# Patient Record
Sex: FEMALE | Race: BLACK OR AFRICAN AMERICAN | Employment: UNEMPLOYED
[De-identification: names, ages, dates, MRNs, and addresses within clinical notes are randomized per-mention and may not be internally consistent; named-entity substitution may affect disease eponyms.]

---

## 2017-08-12 ENCOUNTER — HEALTH MAINTENANCE LETTER (OUTPATIENT)
Age: 42
End: 2017-08-12

## 2018-08-27 ENCOUNTER — OFFICE VISIT (OUTPATIENT)
Dept: FAMILY MEDICINE | Facility: CLINIC | Age: 43
End: 2018-08-27
Payer: MEDICARE

## 2018-08-27 VITALS
BODY MASS INDEX: 19.52 KG/M2 | OXYGEN SATURATION: 98 % | HEIGHT: 64 IN | HEART RATE: 79 BPM | DIASTOLIC BLOOD PRESSURE: 80 MMHG | SYSTOLIC BLOOD PRESSURE: 132 MMHG | TEMPERATURE: 98.6 F | WEIGHT: 114.3 LBS | RESPIRATION RATE: 16 BRPM

## 2018-08-27 DIAGNOSIS — N76.5 VAGINAL ULCERATION: ICD-10-CM

## 2018-08-27 DIAGNOSIS — B96.89 BV (BACTERIAL VAGINOSIS): Primary | ICD-10-CM

## 2018-08-27 DIAGNOSIS — J45.20 MILD INTERMITTENT ASTHMA WITHOUT COMPLICATION: ICD-10-CM

## 2018-08-27 DIAGNOSIS — L30.9 ECZEMA, UNSPECIFIED TYPE: ICD-10-CM

## 2018-08-27 DIAGNOSIS — Z11.4 SCREENING FOR HIV (HUMAN IMMUNODEFICIENCY VIRUS): ICD-10-CM

## 2018-08-27 DIAGNOSIS — B37.31 CANDIDAL VULVOVAGINITIS: ICD-10-CM

## 2018-08-27 DIAGNOSIS — Z11.3 SCREEN FOR STD (SEXUALLY TRANSMITTED DISEASE): ICD-10-CM

## 2018-08-27 DIAGNOSIS — Z12.4 SCREENING FOR MALIGNANT NEOPLASM OF CERVIX: ICD-10-CM

## 2018-08-27 DIAGNOSIS — N76.0 BV (BACTERIAL VAGINOSIS): Primary | ICD-10-CM

## 2018-08-27 LAB
ALBUMIN UR-MCNC: NEGATIVE MG/DL
APPEARANCE UR: CLEAR
BACTERIA #/AREA URNS HPF: ABNORMAL /HPF
BILIRUB UR QL STRIP: NEGATIVE
COLOR UR AUTO: YELLOW
GLUCOSE UR STRIP-MCNC: NEGATIVE MG/DL
HGB UR QL STRIP: NEGATIVE
KETONES UR STRIP-MCNC: ABNORMAL MG/DL
LEUKOCYTE ESTERASE UR QL STRIP: ABNORMAL
MUCOUS THREADS #/AREA URNS LPF: PRESENT /LPF
NITRATE UR QL: NEGATIVE
NON-SQ EPI CELLS #/AREA URNS LPF: ABNORMAL /LPF
PH UR STRIP: 6.5 PH (ref 5–7)
RBC #/AREA URNS AUTO: ABNORMAL /HPF
SOURCE: ABNORMAL
SP GR UR STRIP: 1.02 (ref 1–1.03)
SPECIMEN SOURCE: ABNORMAL
UROBILINOGEN UR STRIP-ACNC: 0.2 EU/DL (ref 0.2–1)
WBC #/AREA URNS AUTO: ABNORMAL /HPF
WET PREP SPEC: ABNORMAL

## 2018-08-27 PROCEDURE — 87624 HPV HI-RISK TYP POOLED RSLT: CPT | Performed by: PHYSICIAN ASSISTANT

## 2018-08-27 PROCEDURE — 87210 SMEAR WET MOUNT SALINE/INK: CPT | Performed by: PHYSICIAN ASSISTANT

## 2018-08-27 PROCEDURE — 87529 HSV DNA AMP PROBE: CPT | Performed by: PHYSICIAN ASSISTANT

## 2018-08-27 PROCEDURE — 87491 CHLMYD TRACH DNA AMP PROBE: CPT | Performed by: PHYSICIAN ASSISTANT

## 2018-08-27 PROCEDURE — 99214 OFFICE O/P EST MOD 30 MIN: CPT | Performed by: PHYSICIAN ASSISTANT

## 2018-08-27 PROCEDURE — G0145 SCR C/V CYTO,THINLAYER,RESCR: HCPCS | Performed by: PHYSICIAN ASSISTANT

## 2018-08-27 PROCEDURE — 81001 URINALYSIS AUTO W/SCOPE: CPT | Performed by: PHYSICIAN ASSISTANT

## 2018-08-27 PROCEDURE — G0476 HPV COMBO ASSAY CA SCREEN: HCPCS | Performed by: PHYSICIAN ASSISTANT

## 2018-08-27 PROCEDURE — 87591 N.GONORRHOEAE DNA AMP PROB: CPT | Performed by: PHYSICIAN ASSISTANT

## 2018-08-27 RX ORDER — TRIAMCINOLONE ACETONIDE 5 MG/G
CREAM TOPICAL
Qty: 45 G | Refills: 1 | Status: SHIPPED | OUTPATIENT
Start: 2018-08-27 | End: 2019-11-21

## 2018-08-27 RX ORDER — MONTELUKAST SODIUM 10 MG/1
10 TABLET ORAL AT BEDTIME
Qty: 90 TABLET | Refills: 3 | Status: SHIPPED | OUTPATIENT
Start: 2018-08-27 | End: 2019-11-21

## 2018-08-27 RX ORDER — METRONIDAZOLE 500 MG/1
500 TABLET ORAL 2 TIMES DAILY
Qty: 14 TABLET | Refills: 0 | Status: SHIPPED | OUTPATIENT
Start: 2018-08-27 | End: 2019-11-21

## 2018-08-27 RX ORDER — FLUCONAZOLE 150 MG/1
150 TABLET ORAL ONCE
Qty: 1 TABLET | Refills: 0 | Status: SHIPPED | OUTPATIENT
Start: 2018-08-27 | End: 2018-08-27

## 2018-08-27 RX ORDER — ISOTRETINOIN 20 MG/1
20 CAPSULE ORAL 2 TIMES DAILY
Status: CANCELLED | OUTPATIENT
Start: 2018-08-27

## 2018-08-27 RX ORDER — ALBUTEROL SULFATE 90 UG/1
2 AEROSOL, METERED RESPIRATORY (INHALATION) EVERY 4 HOURS PRN
Qty: 1 INHALER | Refills: 3 | Status: SHIPPED | OUTPATIENT
Start: 2018-08-27 | End: 2019-12-02

## 2018-08-27 ASSESSMENT — PAIN SCALES - GENERAL: PAINLEVEL: EXTREME PAIN (9)

## 2018-08-27 NOTE — LETTER
September 4, 2018    Vita Hahn Nguyễn  [address]  [place] [address]    Dear Vita,  We are happy to inform you that your PAP smear result from 8/27/18 is normal.  We are now able to do a follow up test on PAP smears. The DNA test is for HPV (Human Papilloma Virus). Cervical cancer is closely linked with certain types of HPV. Your results showed no evidence of high risk HPV.  Therefore we recommend you return in 5 years for your next pap smear and HPV test.  You will still need to return to the clinic every year for an annual exam and other preventive tests.  Please contact the clinic at [phone] with any questions.  Sincerely,    Krysten Jones PA-C/ramakrishna

## 2018-08-27 NOTE — PROGRESS NOTES
SUBJECTIVE:   Vita Adrian is a 43 year old female who presents to clinic today for the following health issues:    Vaginal Symptoms  Onset: 3 days ago     Description:  Vaginal Discharge: white green curd-like   Itching (Pruritis): YES  Burning sensation:  YES  Odor: YES    Accompanying Signs & Symptoms:  Pain with Urination: YES  Abdominal Pain: YES  Fever: YES  Chills: YES     History:   Sexually active: YES- was last time over a week ago   New Partner: no   Possibility of Pregnancy:  No    Precipitating factors:   Recent Antibiotic Use: no     Therapies Tried and outcome: None    Asthma:  Stable  No recent triggers   Patient has been out of inhaler and Singulair for a few months.     Eczema:  Patient gets eczema on elbows. Her dermatologist prescribes her Triamcinolone.   Patient needs a refill.     Problem list and histories reviewed & adjusted, as indicated.  Additional history: as documented    Patient Active Problem List   Diagnosis     Atopic dermatitis     Seasonal allergic rhinitis     Allergic rhinitis due to animal dander     Rhinitis, allergic to other allergen     Mild intermittent asthma     Seasonal allergic conjunctivitis     CARDIOVASCULAR SCREENING; LDL GOAL LESS THAN 160     Vitamin D deficiency     Diagnostic skin and sensitization tests     Health Care Home     Cervical radiculopathy - right>left     Flexor tendon laceration, hand, open wound - left 5th      Schizophrenia (H)     Major depressive disorder, recurrent episode, severe (H)     Generalized anxiety disorder     Past Surgical History:   Procedure Laterality Date     C NONSPECIFIC PROCEDURE      Elective Abortions x2     REPAIR TENDON FINGER(S)  5/18/2012    Procedure:REPAIR TENDON FINGER(S); Left Small Finger Flexor Tendon Laceration Repair  ; Surgeon:MIGUE KATHLEEN; Location: OR       Social History   Substance Use Topics     Smoking status: Never Smoker     Smokeless tobacco: Never Used     Alcohol use No     Family  History   Problem Relation Age of Onset     Diabetes No family hx of      Cerebrovascular Disease No family hx of      Thyroid Disease No family hx of      Cancer Mother      Hypertension Maternal Grandmother      Glaucoma Maternal Grandmother      Macular Degeneration Maternal Grandmother          Current Outpatient Prescriptions   Medication Sig Dispense Refill     albuterol (PROAIR HFA/PROVENTIL HFA/VENTOLIN HFA) 108 (90 Base) MCG/ACT inhaler Inhale 2 puffs into the lungs every 4 hours as needed for shortness of breath / dyspnea 1 Inhaler 3     isotretinoin (CLARAVIS) 20 MG capsule Take 20 mg by mouth 2 times daily.       metroNIDAZOLE (FLAGYL) 500 MG tablet Take 1 tablet (500 mg) by mouth 2 times daily 14 tablet 0     mometasone (NASONEX) 50 MCG/ACT nasal spray Spray 2 sprays in nostril daily. 1 Box 11     montelukast (SINGULAIR) 10 MG tablet Take 1 tablet (10 mg) by mouth At Bedtime 90 tablet 3     polyvinyl alcohol (ARTIFICIAL TEARS) 1.4 % ophthalmic solution Apply 1 drop to eye 4 times daily as needed. 15 mL 11     triamcinolone (KENALOG) 0.5 % cream Apply  topically 2 times daily as needed. 45 g 1     ethynodiol-ethinyl estradiol (KELNOR) 1-35 MG-MCG per tablet Take 1 tablet by mouth daily. (Patient not taking: Reported on 8/27/2018) 28 tablet 6     levocetirizine (XYZAL) 5 MG tablet Take 1 tablet by mouth every evening. (Patient not taking: Reported on 8/27/2018) 30 tablet 11     omeprazole (PRILOSEC) 40 MG capsule Take 1 capsule by mouth daily. Take 30 minutes before breakfast. (Patient not taking: Reported on 8/27/2018) 30 capsule 11     Allergies   Allergen Reactions     Penicillins        Reviewed and updated as needed this visit by clinical staff  Tobacco  Allergies  Meds  Med Hx  Surg Hx  Fam Hx  Soc Hx      Reviewed and updated as needed this visit by Provider         ROS:  Constitutional, HEENT, cardiovascular, pulmonary, GI, , musculoskeletal, neuro, skin, endocrine and psych systems are  "negative, except as otherwise noted.    OBJECTIVE:     /80 (BP Location: Right arm, Patient Position: Sitting, Cuff Size: Adult Regular)  Pulse 79  Temp 98.6  F (37  C) (Oral)  Resp 16  Ht 5' 4\" (1.626 m)  Wt 114 lb 4.8 oz (51.8 kg)  LMP 07/30/2018 (Approximate)  SpO2 98%  BMI 19.62 kg/m2  Body mass index is 19.62 kg/(m^2).  GENERAL: healthy, alert and no distress  NECK: no adenopathy, no asymmetry, masses, or scars and thyroid normal to palpation  RESP: lungs clear to auscultation - no rales, rhonchi or wheezes  CV: regular rate and rhythm, normal S1 S2, no S3 or S4, no murmur, click or rub, no peripheral edema and peripheral pulses strong  ABDOMEN: soft, nontender, no hepatosplenomegaly, no masses and bowel sounds normal   (female): normal female external genitalia, normal urethral meatus , vaginal mucosa is erythematous, inflamed, with small ulcerations ,  moist, well rugated, vaginal discharge - copious, yellow and green and normal cervix, adnexae, and uterus without masses.  MS: no gross musculoskeletal defects noted, no edema    Diagnostic Test Results:  Results for orders placed or performed in visit on 08/27/18   *UA reflex to Microscopic and Culture ([facility] (except [place] and [place])   Result Value Ref Range    Color Urine Yellow     Appearance Urine Clear     Glucose Urine Negative NEG^Negative mg/dL    Bilirubin Urine Negative NEG^Negative    Ketones Urine Trace (A) NEG^Negative mg/dL    Specific Gravity Urine 1.025 1.003 - 1.035    Blood Urine Negative NEG^Negative    pH Urine 6.5 5.0 - 7.0 pH    Protein Albumin Urine Negative NEG^Negative mg/dL    Urobilinogen Urine 0.2 0.2 - 1.0 EU/dL    Nitrite Urine Negative NEG^Negative    Leukocyte Esterase Urine Trace (A) NEG^Negative    Source Midstream Urine    Urine Microscopic   Result Value Ref Range    WBC Urine 0 - 5 OTO5^0 - 5 /HPF    RBC Urine O - 2 OTO2^O - 2 /HPF    Squamous Epithelial /LPF Urine Few FEW^Few " /LPF    Bacteria Urine Few (A) NEG^Negative /HPF    Mucous Urine Present (A) NEG^Negative /LPF   Wet prep   Result Value Ref Range    Specimen Description Vagina     Wet Prep No Trichomonas seen     Wet Prep Clue cells seen (A)     Wet Prep Yeast seen (A)        ASSESSMENT/PLAN:               ICD-10-CM    1. BV (bacterial vaginosis) N76.0 metroNIDAZOLE (FLAGYL) 500 MG tablet    B96.89    2. Candidal vulvovaginitis B37.3 fluconazole (DIFLUCAN) 150 MG tablet   3. Vaginal ulceration N76.5 HSV 1 and 2 DNA by PCR   4. Mild intermittent asthma without complication J45.20 albuterol (PROAIR HFA/PROVENTIL HFA/VENTOLIN HFA) 108 (90 Base) MCG/ACT inhaler     montelukast (SINGULAIR) 10 MG tablet   5. Eczema, unspecified type L30.9 triamcinolone (KENALOG) 0.5 % cream   6. Screening for malignant neoplasm of cervix Z12.4 Pap imaged thin layer screen with HPV - recommended age 30 - 65 years (select HPV order below)     HPV High Risk Types DNA Cervical   7. Screening for HIV (human immunodeficiency virus) Z11.4    8. Screen for STD (sexually transmitted disease) Z11.3 NEISSERIA GONORRHOEA PCR     CHLAMYDIA TRACHOMATIS PCR     Treponema Abs w Reflex to RPR and Titer     HIV Screening     CANCELED: HIV Screening     CANCELED: Treponema Abs w Reflex to RPR and Titer     1.Metronidazole 500 mg twice a day for 7 days  2.Diflican 150 mg once   3. Possibly due to inflammation, but testing for herpes.  4. Restart Singulair and Albuterol   5. Triamcinolone to elbows as needed     Krysten Jones PA-C  [facility]

## 2018-08-27 NOTE — LETTER
[address] [address]  [phone]        September 3, 2019    Vita Adrian  [address] [facility] [id number]              Dear Vita Adrian    This is to remind you that your Non-fasting lab is due.    You may call our office at [phone] to schedule an appointment.    Please disregard this notice if you have already had your labs drawn or made an appointment.        Sincerely,        Krysten Jones

## 2018-08-27 NOTE — LETTER
My Asthma Action Plan  Name: Vita Adrian   YOB: 1975  Date: 8/27/2018   My doctor: Krysten Jones PA-C   My clinic: [facility]        My Control Medicine: Montelukast (Singulair) -  10 mg qd  My Rescue Medicine: Albuterol (Proair/Ventolin/Proventil) inhaler 2 puffs q 4-6 hrs prn    My Asthma Severity: intermittent  Avoid your asthma triggers: upper respiratory infections               GREEN ZONE   Good Control    I feel good    No cough or wheeze    Can work, sleep and play without asthma symptoms       Take your asthma control medicine every day.     1. If exercise triggers your asthma, take your rescue medication    15 minutes before exercise or sports, and    During exercise if you have asthma symptoms  2. Spacer to use with inhaler: If you have a spacer, make sure to use it with your inhaler             YELLOW ZONE Getting Worse  I have ANY of these:    I do not feel good    Cough or wheeze    Chest feels tight    Wake up at night   1. Keep taking your Green Zone medications  2. Start taking your rescue medicine:    every 20 minutes for up to 1 hour. Then every 4 hours for 24-48 hours.  3. If you stay in the Yellow Zone for more than 12-24 hours, contact your doctor.  4. If you do not return to the Green Zone in 12-24 hours or you get worse, start taking your oral steroid medicine if prescribed by your provider.           RED ZONE Medical Alert - Get Help  I have ANY of these:    I feel awful    Medicine is not helping    Breathing getting harder    Trouble walking or talking    Nose opens wide to breathe       1. Take your rescue medicine NOW  2. If your provider has prescribed an oral steroid medicine, start taking it NOW  3. Call your doctor NOW  4. If you are still in the Red Zone after 20 minutes and you have not reached your doctor:    Take your rescue medicine again and    Call 911 or go to the emergency room right away    See your regular  doctor within 2 weeks of an Emergency Room or Urgent Care visit for follow-up treatment.          Annual Reminders:  Meet with Asthma Educator,  Flu Shot in the Fall, consider Pneumonia Vaccination for patients with asthma (aged 19 and older).    Pharmacy:    [facility] [place], [place] - [address]  [facility] [id number] [place] [address] [address] [facility] [address] [facility]                      Asthma Triggers  How To Control Things That Make Your Asthma Worse    Triggers are things that make your asthma worse.  Look at the list below to help you find your triggers and what you can do about them.  You can help prevent asthma flare-ups by staying away from your triggers.      Trigger                                                          What you can do   Cigarette Smoke  Tobacco smoke can make asthma worse. Do not allow smoking in your home, car or around you.  Be sure no one smokes at a child s day care or school.  If you smoke, ask your health care provider for ways to help you quit.  Ask family members to quit too.  Ask your health care provider for a referral to Quit Plan to help you quit smoking, or call 2-268-582-PLAN.     Colds, Flu, Bronchitis  These are common triggers of asthma. Wash your hands often.  Don t touch your eyes, nose or mouth.  Get a flu shot every year.     Dust Mites  These are tiny bugs that live in cloth or carpet. They are too small to see. Wash sheets and blankets in hot water every week.   Encase pillows and mattress in dust mite proof covers.  Avoid having carpet if you can. If you have carpet, vacuum weekly.   Use a dust mask and HEPA vacuum.   Pollen and Outdoor Mold  Some people are allergic to trees, grass, or weed pollen, or molds. Try to keep your windows closed.  Limit time out doors when pollen count is high.   Ask you health care provider about taking medicine during allergy season.     Animal Dander  Some people  are allergic to skin flakes, urine or saliva from pets with fur or feathers. Keep pets with fur or feathers out of your home.    If you can t keep the pet outdoors, then keep the pet out of your bedroom.  Keep the bedroom door closed.  Keep pets off cloth furniture and away from stuffed toys.     Mice, Rats, and Cockroaches  Some people are allergic to the waste from these pests.   Cover food and garbage.  Clean up spills and food crumbs.  Store grease in the refrigerator.   Keep food out of the bedroom.   Indoor Mold  This can be a trigger if your home has high moisture. Fix leaking faucets, pipes, or other sources of water.   Clean moldy surfaces.  Dehumidify basement if it is damp and smelly.   Smoke, Strong Odors, and Sprays  These can reduce air quality. Stay away from strong odors and sprays, such as perfume, powder, hair spray, paints, smoke incense, paint, cleaning products, candles and new carpet.   Exercise or Sports  Some people with asthma have this trigger. Be active!  Ask your doctor about taking medicine before sports or exercise to prevent symptoms.    Warm up for 5-10 minutes before and after sports or exercise.     Other Triggers of Asthma  Cold air:  Cover your nose and mouth with a scarf.  Sometimes laughing or crying can be a trigger.  Some medicines and food can trigger asthma.

## 2018-08-27 NOTE — MR AVS SNAPSHOT
After Visit Summary   8/27/2018    Vita Adrian    MRN: [id number]           Patient Information     Date Of Birth          1975        Visit Information        Provider Department      8/27/2018 4:00 PM Krysten Jones PA-C [facility]        Today's Diagnoses     Dysuria    -  1    Vaginal discomfort        Screening for malignant neoplasm of cervix        Screening for HIV (human immunodeficiency virus)        Mild intermittent asthma without complication        Eczema, unspecified type        Screen for STD (sexually transmitted disease)        Vaginal ulceration        BV (bacterial vaginosis)        Candidal vulvovaginitis           Follow-ups after your visit        Your next 10 appointments already scheduled     Sep 12, 2018  2:30 PM CDT   Office Visit with Jose Manuel Ibanez MD   [facility] ([facility]    [address] [place] [address] [phone]           Bring a current list of meds and any records pertaining to this visit. For Physicals, please bring immunization records and any forms needing to be filled out. Please arrive 10 minutes early to complete paperwork.              Who to contact     If you have questions or need follow up information about today's clinic visit or your schedule please contact [facility] directly at [phone].  Normal or non-critical lab and imaging results will be communicated to you by MyChart, letter or phone within 4 business days after the clinic has received the results. If you do not hear from us within 7 days, please contact the clinic through MyChart or phone. If you have a critical or abnormal lab result, we will notify you by phone as soon as possible.  Submit refill requests through [employer] or call your pharmacy and they will forward the refill request to us. Please allow 3 business days for your refill to be completed.        "   Additional Information About Your Visit        MyChart Information     [facility] gives you secure access to your electronic health record. If you see a primary care provider, you can also send messages to your care team and make appointments. If you have questions, please call your primary care clinic.  If you do not have a primary care provider, please call [phone] and they will assist you.        Care EveryWhere ID     This is your Care EveryWhere ID. This could be used by other organizations to access your [facility] medical records  [id number]        Your Vitals Were     Pulse Temperature Respirations Height Last Period Pulse Oximetry    79 98.6  F (37  C) (Oral) 16 5' 4\" (1.626 m) 07/30/2018 (Approximate) 98%    BMI (Body Mass Index)                   19.62 kg/m2            Blood Pressure from Last 3 Encounters:   08/27/18 132/80   05/22/13 109/72   09/11/12 112/74    Weight from Last 3 Encounters:   08/27/18 114 lb 4.8 oz (51.8 kg)   05/22/13 117 lb (53.1 kg)   09/11/12 118 lb (53.5 kg)              We Performed the Following     *UA reflex to Microscopic and Culture ([place] and [facility] (except [place] and [place])     Asthma Action Plan (AAP)     CHLAMYDIA TRACHOMATIS PCR     HIV Screening     HPV High Risk Types DNA Cervical     HSV 1 and 2 DNA by PCR     NEISSERIA GONORRHOEA PCR     Pap imaged thin layer screen with HPV - recommended age 30 - 65 years (select HPV order below)     Treponema Abs w Reflex to RPR and Titer     Urine Microscopic     Wet prep          Today's Medication Changes          These changes are accurate as of 8/27/18  5:23 PM.  If you have any questions, ask your nurse or doctor.               Start taking these medicines.        Dose/Directions    fluconazole 150 MG tablet   Commonly known as:  DIFLUCAN   Used for:  Candidal vulvovaginitis   Started by:  Krysten oJnes PA-C        Dose:  150 mg   Take 1 tablet (150 mg) by mouth once for 1 dose "   Quantity:  1 tablet   Refills:  0       metroNIDAZOLE 500 MG tablet   Commonly known as:  FLAGYL   Used for:  BV (bacterial vaginosis)   Started by:  Krysten Jones PA-C        Dose:  500 mg   Take 1 tablet (500 mg) by mouth 2 times daily   Quantity:  14 tablet   Refills:  0            Where to get your medicines      These medications [place] [facility] [place] [address]  [address], [place] [address]     Phone:  [phone]     fluconazole 150 MG tablet    metroNIDAZOLE 500 MG tablet    triamcinolone 0.5 % cream                Primary Care Provider Office Phone # Fax #    Alicesid Seay PA-C [phone] [address] [place] [address]        Equal Access to Services     [facility]: Hadii aad ku hadasho Solulu, waaxda luqadaha, qaybta kaalmada adeegyada, chato sears . So [facility] [phone].    ATENCIÓN: Si habla español, tiene a díaz disposición servicios gratuitos de asistencia lingüística. [facility] [phone].    We comply with applicable federal civil rights laws and [place] laws. We do not discriminate on the basis of race, color, national origin, age, disability, sex, sexual orientation, or gender identity.            Thank you!     Thank you for choosing [facility]  for your care. Our goal is always to provide you with excellent care. Hearing back from our patients is one way we can continue to improve our services. Please take a few minutes to complete the written survey that you may receive in the mail after your visit with us. Thank you!             Your Updated Medication List - Protect others around you: Learn how to safely use, store and throw away your medicines at www.disposemymeds.org.          This list is accurate as of 8/27/18  5:23 PM.  Always use your most recent med list.                   Brand Name Dispense Instructions for use  Diagnosis    albuterol 108 (90 Base) MCG/ACT inhaler    PROAIR HFA/PROVENTIL HFA/VENTOLIN HFA    1 Inhaler    Inhale 2 puffs into the lungs every 4 hours as needed for shortness of breath / dyspnea.    Intermittent asthma       CLARAVIS 20 MG capsule   Generic drug:  ISOtretinoin      Take 20 mg by mouth 2 times daily.        ethynodiol-ethinyl estradiol 1-35 MG-MCG per tablet    KELNOR    28 tablet    Take 1 tablet by mouth daily.    Dysmenorrhea       fluconazole 150 MG tablet    DIFLUCAN    1 tablet    Take 1 tablet (150 mg) by mouth once for 1 dose    Candidal vulvovaginitis       levocetirizine 5 MG tablet    XYZAL    30 tablet    Take 1 tablet by mouth every evening.    Seasonal allergic rhinitis       metroNIDAZOLE 500 MG tablet    FLAGYL    14 tablet    Take 1 tablet (500 mg) by mouth 2 times daily    BV (bacterial vaginosis)       mometasone 50 MCG/ACT spray    NASONEX    1 Box    Spray 2 sprays in nostril daily.    Seasonal allergic rhinitis       montelukast 10 MG tablet    SINGULAIR    30 tablet    Take 1 tablet by mouth At Bedtime.    Seasonal allergic rhinitis       omeprazole 40 MG capsule    priLOSEC    30 capsule    Take 1 capsule by mouth daily. Take 30 minutes before breakfast.    GERD (gastroesophageal reflux disease)       polyvinyl alcohol 1.4 % ophthalmic solution    ARTIFICIAL TEARS    15 mL    Apply 1 drop to eye 4 times daily as needed.    Seasonal allergic conjunctivitis       triamcinolone 0.5 % cream    KENALOG    45 g    Apply  topically 2 times daily as needed.    Eczema, unspecified type

## 2018-08-28 ASSESSMENT — ASTHMA QUESTIONNAIRES: ACT_TOTALSCORE: 17

## 2018-08-29 LAB
C TRACH DNA SPEC QL NAA+PROBE: NEGATIVE
HSV1 DNA SPEC QL NAA+PROBE: NEGATIVE
HSV2 DNA SPEC QL NAA+PROBE: NEGATIVE
N GONORRHOEA DNA SPEC QL NAA+PROBE: NEGATIVE
SPECIMEN SOURCE: NORMAL

## 2018-08-30 LAB
COPATH REPORT: NORMAL
PAP: NORMAL

## 2018-08-31 LAB
FINAL DIAGNOSIS: NORMAL
HPV HR 12 DNA CVX QL NAA+PROBE: NEGATIVE
HPV16 DNA SPEC QL NAA+PROBE: NEGATIVE
HPV18 DNA SPEC QL NAA+PROBE: NEGATIVE
SPECIMEN DESCRIPTION: NORMAL
SPECIMEN SOURCE CVX/VAG CYTO: NORMAL

## 2019-11-06 ENCOUNTER — HEALTH MAINTENANCE LETTER (OUTPATIENT)
Age: 44
End: 2019-11-06

## 2019-11-20 ENCOUNTER — TELEPHONE (OUTPATIENT)
Dept: FAMILY MEDICINE | Facility: CLINIC | Age: 44
End: 2019-11-20

## 2019-11-20 NOTE — TELEPHONE ENCOUNTER
Reason for Call:  Other     Detailed comments: patient was told by the clinic she went to in [place] that we would have to request medical records. Patient has an appointment on 11/21/2019. To get the medical records they need the request to say, Authorization for urgent appointment. Fax request to [phone]. Clinic is [facility], Dr Kaykay Lopez. Patient is seeing DEVANTE Jones at 8:20 am. Patient had been in the emergency room yesterday at [facility].    Phone Number Patient can be reached at: Home number on file [phone] (home)    Best Time: any    Can we leave a detailed message on this number? YES    Call taken on 11/20/2019 at 3:51 PM by Alexandra Begum

## 2019-11-21 ENCOUNTER — OFFICE VISIT (OUTPATIENT)
Dept: FAMILY MEDICINE | Facility: CLINIC | Age: 44
End: 2019-11-21
Payer: MEDICAID

## 2019-11-21 VITALS
RESPIRATION RATE: 20 BRPM | DIASTOLIC BLOOD PRESSURE: 87 MMHG | HEART RATE: 76 BPM | SYSTOLIC BLOOD PRESSURE: 132 MMHG | TEMPERATURE: 98.3 F | WEIGHT: 113.2 LBS | OXYGEN SATURATION: 99 % | BODY MASS INDEX: 19.43 KG/M2

## 2019-11-21 DIAGNOSIS — F20.9 SCHIZOPHRENIA, UNSPECIFIED TYPE (H): ICD-10-CM

## 2019-11-21 DIAGNOSIS — Z13.6 CARDIOVASCULAR SCREENING; LDL GOAL LESS THAN 130: ICD-10-CM

## 2019-11-21 DIAGNOSIS — J45.20 MILD INTERMITTENT ASTHMA WITHOUT COMPLICATION: ICD-10-CM

## 2019-11-21 DIAGNOSIS — Z11.4 ENCOUNTER FOR SCREENING FOR HIV: ICD-10-CM

## 2019-11-21 DIAGNOSIS — H93.8X3 EAR POPPING, BILATERAL: ICD-10-CM

## 2019-11-21 DIAGNOSIS — L65.9 HAIR LOSS: ICD-10-CM

## 2019-11-21 DIAGNOSIS — L29.9 PRURITIC DISORDER: Primary | ICD-10-CM

## 2019-11-21 DIAGNOSIS — L30.9 ECZEMA, UNSPECIFIED TYPE: ICD-10-CM

## 2019-11-21 DIAGNOSIS — F33.3 SEVERE EPISODE OF RECURRENT MAJOR DEPRESSIVE DISORDER, WITH PSYCHOTIC FEATURES (H): ICD-10-CM

## 2019-11-21 PROBLEM — M25.519 SHOULDER PAIN: Status: ACTIVE | Noted: 2019-11-21

## 2019-11-21 PROBLEM — M54.2 NECK PAIN: Status: ACTIVE | Noted: 2019-11-21

## 2019-11-21 LAB
BASOPHILS # BLD AUTO: 0 10E9/L (ref 0–0.2)
BASOPHILS NFR BLD AUTO: 0.2 %
CHOLEST SERPL-MCNC: 216 MG/DL
DIFFERENTIAL METHOD BLD: ABNORMAL
EOSINOPHIL # BLD AUTO: 0 10E9/L (ref 0–0.7)
EOSINOPHIL NFR BLD AUTO: 0.2 %
ERYTHROCYTE [DISTWIDTH] IN BLOOD BY AUTOMATED COUNT: 13.3 % (ref 10–15)
HCT VFR BLD AUTO: 37.7 % (ref 35–47)
HDLC SERPL-MCNC: 73 MG/DL
HGB BLD-MCNC: 12.3 G/DL (ref 11.7–15.7)
LDLC SERPL CALC-MCNC: 135 MG/DL
LYMPHOCYTES # BLD AUTO: 1.1 10E9/L (ref 0.8–5.3)
LYMPHOCYTES NFR BLD AUTO: 8.5 %
MCH RBC QN AUTO: 28.7 PG (ref 26.5–33)
MCHC RBC AUTO-ENTMCNC: 32.6 G/DL (ref 31.5–36.5)
MCV RBC AUTO: 88 FL (ref 78–100)
MONOCYTES # BLD AUTO: 0.5 10E9/L (ref 0–1.3)
MONOCYTES NFR BLD AUTO: 3.9 %
NEUTROPHILS # BLD AUTO: 11.3 10E9/L (ref 1.6–8.3)
NEUTROPHILS NFR BLD AUTO: 87.2 %
NONHDLC SERPL-MCNC: 143 MG/DL
PLATELET # BLD AUTO: 304 10E9/L (ref 150–450)
RBC # BLD AUTO: 4.29 10E12/L (ref 3.8–5.2)
TRIGL SERPL-MCNC: 41 MG/DL
TSH SERPL DL<=0.005 MIU/L-ACNC: 0.97 MU/L (ref 0.4–4)
WBC # BLD AUTO: 13 10E9/L (ref 4–11)

## 2019-11-21 PROCEDURE — 85025 COMPLETE CBC W/AUTO DIFF WBC: CPT | Performed by: NURSE PRACTITIONER

## 2019-11-21 PROCEDURE — 84443 ASSAY THYROID STIM HORMONE: CPT | Performed by: NURSE PRACTITIONER

## 2019-11-21 PROCEDURE — 87389 HIV-1 AG W/HIV-1&-2 AB AG IA: CPT | Performed by: NURSE PRACTITIONER

## 2019-11-21 PROCEDURE — 99204 OFFICE O/P NEW MOD 45 MIN: CPT | Performed by: NURSE PRACTITIONER

## 2019-11-21 PROCEDURE — 80061 LIPID PANEL: CPT | Performed by: NURSE PRACTITIONER

## 2019-11-21 PROCEDURE — 36415 COLL VENOUS BLD VENIPUNCTURE: CPT | Performed by: NURSE PRACTITIONER

## 2019-11-21 RX ORDER — FAMOTIDINE 40 MG/1
40 TABLET, FILM COATED ORAL 2 TIMES DAILY
Qty: 60 TABLET | Refills: 3 | Status: SHIPPED | OUTPATIENT
Start: 2019-11-21 | End: 2019-11-21

## 2019-11-21 RX ORDER — CETIRIZINE HYDROCHLORIDE 10 MG/1
10 TABLET ORAL DAILY
Qty: 90 TABLET | Refills: 3 | Status: SHIPPED | OUTPATIENT
Start: 2019-11-21

## 2019-11-21 RX ORDER — MONTELUKAST SODIUM 10 MG/1
10 TABLET ORAL AT BEDTIME
Qty: 90 TABLET | Refills: 3 | Status: SHIPPED | OUTPATIENT
Start: 2019-11-21 | End: 2019-11-21

## 2019-11-21 RX ORDER — TRIAMCINOLONE ACETONIDE 5 MG/G
CREAM TOPICAL
Qty: 45 G | Refills: 1 | Status: SHIPPED | OUTPATIENT
Start: 2019-11-21

## 2019-11-21 RX ORDER — MONTELUKAST SODIUM 10 MG/1
10 TABLET ORAL AT BEDTIME
Qty: 90 TABLET | Refills: 3 | Status: SHIPPED | OUTPATIENT
Start: 2019-11-21

## 2019-11-21 RX ORDER — DESONIDE 0.5 MG/G
CREAM TOPICAL 2 TIMES DAILY
Qty: 30 G | Refills: 1 | Status: SHIPPED | OUTPATIENT
Start: 2019-11-21 | End: 2019-11-21

## 2019-11-21 RX ORDER — DESONIDE 0.5 MG/G
CREAM TOPICAL 2 TIMES DAILY
Qty: 30 G | Refills: 1 | Status: SHIPPED | OUTPATIENT
Start: 2019-11-21 | End: 2019-12-29

## 2019-11-21 RX ORDER — CETIRIZINE HYDROCHLORIDE 10 MG/1
10 TABLET ORAL DAILY
Qty: 90 TABLET | Refills: 3 | Status: SHIPPED | OUTPATIENT
Start: 2019-11-21 | End: 2019-11-21

## 2019-11-21 RX ORDER — FAMOTIDINE 40 MG/1
40 TABLET, FILM COATED ORAL 2 TIMES DAILY
Qty: 60 TABLET | Refills: 3 | Status: SHIPPED | OUTPATIENT
Start: 2019-11-21 | End: 2021-06-07

## 2019-11-21 ASSESSMENT — PAIN SCALES - GENERAL: PAINLEVEL: NO PAIN (0)

## 2019-11-21 NOTE — TELEPHONE ENCOUNTER
Patient's appointment was changed to Kristin Kauffman's schedule due to Ester not in clinic, patient should be able to sign a ANDREA when she sees Kristin to request records.  Howard Casey,  For 1st Floor Primary Care ([facility])

## 2019-11-21 NOTE — PATIENT INSTRUCTIONS
For symptoms:  Continue steroids  -continue singulair and cetirizine daily  -add famotidine (pepcid) twice a day every day  -can use desowen to face  See allergy and ENT.    See psychiatry for medication management.

## 2019-11-21 NOTE — PROGRESS NOTES
Subjective     Vita Adrian is a 44 year old female who presents to clinic today for the following health issues:    HPI     Concern - Possible Allergies  Onset: June 2018    Description:   Pt states it all started when her work changed uniforms and these uniforms are made of a certain type of chemical/fabrics, and since then she's been having headaches, ear popping, gait instability, hand jerks, hair loss, skin problems. She finally went in to  on 11/19/2019.   Therapies Tried and outcome:    Patient recently moved back to [place].  Was previously living in [place].  This is when she was working for [employer] and was required to wear the new uniforms.  She states that 400 + people at the company are out of work due to complications similar to hers that are thought to be related to the new uniforms.  Shows me pictures of her hives today.  While in [place] she was given zyrtec and singulair which she states help but are not completely effective.  They work more for the itching but not for some of her other symptoms she attributes to the uniforms (as listed above).  States blood work was done recently in [place].  I do not have these records today.  Of note, schizophrenia and MDD are on her problem list.  She states she was previously on Depakote and some other medications and would like to see psychiatry again.  I do not have record of these medications or her schizophrenia diagnosis.      Overall, today patient would like a referral to allergy as well as psychiatry and a refill on her medications.  No other complaints at this time      Patient Active Problem List   Diagnosis     Atopic dermatitis     Seasonal allergic rhinitis     Allergic rhinitis due to animal dander     Rhinitis, allergic to other allergen     Mild intermittent asthma     Seasonal allergic conjunctivitis     CARDIOVASCULAR SCREENING; LDL GOAL LESS THAN 160     Vitamin D deficiency     Diagnostic skin and sensitization tests     Health  Care Home     Cervical radiculopathy - right>left     Flexor tendon laceration, hand, open wound - left 5th      Schizophrenia (H)     Major depressive disorder, recurrent episode, severe (H)     Generalized anxiety disorder     Acne vulgaris     Neck pain     Shoulder pain     Past Surgical History:   Procedure Laterality Date     C NONSPECIFIC PROCEDURE      Elective Abortions x2     REPAIR TENDON FINGER(S)  5/18/2012    Procedure:REPAIR TENDON FINGER(S); Left Small Finger Flexor Tendon Laceration Repair  ; Surgeon:MIGUE KATHLEEN; Location: OR       Social History     Tobacco Use     Smoking status: Never Smoker     Smokeless tobacco: Never Used   Substance Use Topics     Alcohol use: No     Family History   Problem Relation Age of Onset     Cancer Mother      Hypertension Maternal Grandmother      Glaucoma Maternal Grandmother      Macular Degeneration Maternal Grandmother      Diabetes No family hx of      Cerebrovascular Disease No family hx of      Thyroid Disease No family hx of          Current Outpatient Medications   Medication Sig Dispense Refill     albuterol (PROAIR HFA/PROVENTIL HFA/VENTOLIN HFA) 108 (90 Base) MCG/ACT inhaler Inhale 2 puffs into the lungs every 4 hours as needed for shortness of breath / dyspnea 1 Inhaler 3     cetirizine (ZYRTEC) 10 MG tablet Take 1 tablet (10 mg) by mouth daily 90 tablet 3     desonide (DESOWEN) 0.05 % external cream Apply topically 2 times daily Thin layer to face 30 g 1     famotidine (PEPCID) 40 MG tablet Take 1 tablet (40 mg) by mouth 2 times daily 60 tablet 3     isotretinoin (CLARAVIS) 20 MG capsule Take 20 mg by mouth 2 times daily.       montelukast (SINGULAIR) 10 MG tablet Take 1 tablet (10 mg) by mouth At Bedtime 90 tablet 3     triamcinolone (ARISTOCORT HP) 0.5 % external cream Apply  topically 2 times daily as needed. 45 g 1     Allergies   Allergen Reactions     Tomato Swelling     Face gets puffy     Penicillins      BP Readings from Last 3  Encounters:   11/21/19 132/87   08/27/18 132/80   05/22/13 109/72    Wt Readings from Last 3 Encounters:   11/21/19 51.3 kg (113 lb 3.2 oz)   08/27/18 51.8 kg (114 lb 4.8 oz)   05/22/13 53.1 kg (117 lb)                 Reviewed and updated as needed this visit by Provider         Review of Systems   ROS COMP: Constitutional, HEENT, cardiovascular, pulmonary, GI, , musculoskeletal, neuro, skin, endocrine and psych systems are negative, except as otherwise noted.      Objective    /87 (BP Location: Left arm, Patient Position: Chair, Cuff Size: Adult Regular)   Pulse 76   Temp 98.3  F (36.8  C) (Oral)   Resp 20   Wt 51.3 kg (113 lb 3.2 oz)   SpO2 99%   BMI 19.43 kg/m    Body mass index is 19.43 kg/m .  Physical Exam   GENERAL: healthy, alert and no distress  NECK: no adenopathy, no asymmetry, masses, or scars and thyroid normal to palpation  RESP: lungs clear to auscultation - no rales, rhonchi or wheezes  CV: regular rate and rhythm, normal S1 S2, no S3 or S4, no murmur, click or rub, no peripheral edema and peripheral pulses strong  ABDOMEN: soft, nontender, no hepatosplenomegaly, no masses and bowel sounds normal  MS: no gross musculoskeletal defects noted, no edema  SKIN: no suspicious lesions or rashes    Diagnostic Test Results:  Labs reviewed in Epic  Results for orders placed or performed in visit on 11/21/19   HIV Screening     Status: None   Result Value Ref Range    HIV Antigen Antibody Combo Nonreactive NR^Nonreactive       Lipid panel reflex to direct LDL Non-fasting     Status: Abnormal   Result Value Ref Range    Cholesterol 216 (H) <200 mg/dL    Triglycerides 41 <150 mg/dL    HDL Cholesterol 73 >49 mg/dL    LDL Cholesterol Calculated 135 (H) <100 mg/dL    Non HDL Cholesterol 143 (H) <130 mg/dL   TSH with free T4 reflex     Status: None   Result Value Ref Range    TSH 0.97 0.40 - 4.00 mU/L   CBC with platelets and differential     Status: Abnormal   Result Value Ref Range    WBC 13.0 (H)  4.0 - 11.0 10e9/L    RBC Count 4.29 3.8 - 5.2 10e12/L    Hemoglobin 12.3 11.7 - 15.7 g/dL    Hematocrit 37.7 35.0 - 47.0 %    MCV 88 78 - 100 fl    MCH 28.7 26.5 - 33.0 pg    MCHC 32.6 31.5 - 36.5 g/dL    RDW 13.3 10.0 - 15.0 %    Platelet Count 304 150 - 450 10e9/L    % Neutrophils 87.2 %    % Lymphocytes 8.5 %    % Monocytes 3.9 %    % Eosinophils 0.2 %    % Basophils 0.2 %    Absolute Neutrophil 11.3 (H) 1.6 - 8.3 10e9/L    Absolute Lymphocytes 1.1 0.8 - 5.3 10e9/L    Absolute Monocytes 0.5 0.0 - 1.3 10e9/L    Absolute Eosinophils 0.0 0.0 - 0.7 10e9/L    Absolute Basophils 0.0 0.0 - 0.2 10e9/L    Diff Method Automated Method            Assessment & Plan     1. Pruritic disorder  Plan for below.  Will also try H2 blocker in addition to singulair and zyrtec.  - ALLERGY/ASTHMA ADULT REFERRAL  - cetirizine (ZYRTEC) 10 MG tablet; Take 1 tablet (10 mg) by mouth daily  Dispense: 90 tablet; Refill: 3  - desonide (DESOWEN) 0.05 % external cream; Apply topically 2 times daily Thin layer to face  Dispense: 30 g; Refill: 1  - famotidine (PEPCID) 40 MG tablet; Take 1 tablet (40 mg) by mouth 2 times daily  Dispense: 60 tablet; Refill: 3    2. Hair loss  - TSH with free T4 reflex  - CBC with platelets and differential    3. Mild intermittent asthma without complication  - ALLERGY/ASTHMA ADULT REFERRAL  - montelukast (SINGULAIR) 10 MG tablet; Take 1 tablet (10 mg) by mouth At Bedtime  Dispense: 90 tablet; Refill: 3    4. Schizophrenia, unspecified type (H)  No real record of this or treatment for it so I do not feel comfortable restarting her medications.  She agrees to see psychiatry here.  I am wondering if part of her issues with [employer] uniform are attributed to this diagnosis.    - MENTAL HEALTH REFERRAL  - Adult; Psychiatry and Medication Management; Psychiatry; [facility]: [facility] Psychiatry Service [phone].  Medication management & future refills will be returned to [facility] PCP upon completion of evaluation; We  nita...    5. Severe episode of recurrent major depressive disorder, with psychotic features (H)  As above.   - MENTAL HEALTH REFERRAL  - Adult; Psychiatry and Medication Management; Psychiatry; [facility]: [facility] Psychiatry Service [phone].  Medication management & future refills will be returned to [facility] PCP upon completion of evaluation; We nita...    6. Ear popping, bilateral  Very bothersome to patient.    - OTOLARYNGOLOGY REFERRAL    7. Eczema, unspecified type  - triamcinolone (ARISTOCORT HP) 0.5 % external cream; Apply  topically 2 times daily as needed.  Dispense: 45 g; Refill: 1    8. HIV screening   - HIV Screening    9. CARDIOVASCULAR SCREENING; LDL GOAL LESS THAN 130  - Lipid panel reflex to direct LDL Non-fasting       Patient Instructions   For symptoms:  Continue steroids  -continue singulair and cetirizine daily  -add famotidine (pepcid) twice a day every day  -can use desowen to face  See allergy and ENT.    See psychiatry for medication management.        Return in about 4 weeks (around 12/19/2019) for Follow Up.    [facility]

## 2019-11-22 LAB — HIV 1+2 AB+HIV1 P24 AG SERPL QL IA: NONREACTIVE

## 2019-11-22 ASSESSMENT — ANXIETY QUESTIONNAIRES
6. BECOMING EASILY ANNOYED OR IRRITABLE: NEARLY EVERY DAY
2. NOT BEING ABLE TO STOP OR CONTROL WORRYING: NEARLY EVERY DAY
1. FEELING NERVOUS, ANXIOUS, OR ON EDGE: NEARLY EVERY DAY
3. WORRYING TOO MUCH ABOUT DIFFERENT THINGS: NEARLY EVERY DAY
GAD7 TOTAL SCORE: 21
5. BEING SO RESTLESS THAT IT IS HARD TO SIT STILL: NEARLY EVERY DAY
IF YOU CHECKED OFF ANY PROBLEMS ON THIS QUESTIONNAIRE, HOW DIFFICULT HAVE THESE PROBLEMS MADE IT FOR YOU TO DO YOUR WORK, TAKE CARE OF THINGS AT HOME, OR GET ALONG WITH OTHER PEOPLE: EXTREMELY DIFFICULT
7. FEELING AFRAID AS IF SOMETHING AWFUL MIGHT HAPPEN: NEARLY EVERY DAY

## 2019-11-22 ASSESSMENT — PATIENT HEALTH QUESTIONNAIRE - PHQ9
SUM OF ALL RESPONSES TO PHQ QUESTIONS 1-9: 27
5. POOR APPETITE OR OVEREATING: NEARLY EVERY DAY

## 2019-11-23 ASSESSMENT — ANXIETY QUESTIONNAIRES: GAD7 TOTAL SCORE: 21

## 2019-11-24 DIAGNOSIS — D72.829 LEUKOCYTOSIS, UNSPECIFIED TYPE: Primary | ICD-10-CM

## 2019-11-25 PROBLEM — L30.9 ECZEMA, UNSPECIFIED TYPE: Status: ACTIVE | Noted: 2019-11-25

## 2019-11-25 PROBLEM — L29.9 PRURITIC DISORDER: Status: ACTIVE | Noted: 2019-11-25

## 2019-11-25 PROBLEM — H93.8X3 EAR POPPING, BILATERAL: Status: ACTIVE | Noted: 2019-11-25

## 2019-11-25 PROBLEM — L65.9 HAIR LOSS: Status: ACTIVE | Noted: 2019-11-25

## 2019-12-02 ENCOUNTER — OFFICE VISIT (OUTPATIENT)
Dept: ALLERGY | Facility: CLINIC | Age: 44
End: 2019-12-02
Payer: COMMERCIAL

## 2019-12-02 VITALS — HEART RATE: 95 BPM | WEIGHT: 113.4 LBS | BODY MASS INDEX: 20.09 KG/M2 | HEIGHT: 63 IN | OXYGEN SATURATION: 100 %

## 2019-12-02 DIAGNOSIS — J45.30 POORLY CONTROLLED MILD PERSISTENT ASTHMA: Primary | ICD-10-CM

## 2019-12-02 DIAGNOSIS — R21 RASH: ICD-10-CM

## 2019-12-02 DIAGNOSIS — L29.9 ITCHING: ICD-10-CM

## 2019-12-02 LAB
FEF 25/75: NORMAL
FEV-1: NORMAL
FEV1/FVC: NORMAL
FVC: NORMAL

## 2019-12-02 PROCEDURE — 94010 BREATHING CAPACITY TEST: CPT | Performed by: ALLERGY & IMMUNOLOGY

## 2019-12-02 PROCEDURE — 99244 OFF/OP CNSLTJ NEW/EST MOD 40: CPT | Mod: 25 | Performed by: ALLERGY & IMMUNOLOGY

## 2019-12-02 RX ORDER — ALBUTEROL SULFATE 90 UG/1
2 AEROSOL, METERED RESPIRATORY (INHALATION) EVERY 4 HOURS PRN
Qty: 1 INHALER | Refills: 3 | Status: SHIPPED | OUTPATIENT
Start: 2019-12-02

## 2019-12-02 RX ORDER — BUDESONIDE AND FORMOTEROL FUMARATE DIHYDRATE 160; 4.5 UG/1; UG/1
2 AEROSOL RESPIRATORY (INHALATION) 2 TIMES DAILY
Qty: 1 INHALER | Refills: 1 | Status: SHIPPED | OUTPATIENT
Start: 2019-12-02

## 2019-12-02 ASSESSMENT — MIFFLIN-ST. JEOR: SCORE: 1123.51

## 2019-12-02 NOTE — Clinical Note
12/2/2019         RE: Vita Adrian  [address]  [facility] [address]        Dear Colleague,    Thank you for referring your patient, Vita Adrian, to the [facility]. Please see a copy of my visit note below.    Dear Kristin Kauffman, APRN, CNP,    Thank you for referring your patient Vita Adrian to the Allergy/Immunology Clinic. Vita Adrian was seen in the Allergy Clinic at [facility]. The following are my recommendations regarding her {Allergydiagnoses:937504}    Vita Adrian is a 44 year old  female being seen today at the request of Kristin Kauffman in consultation for allergies.    States that since she got new uniforms on 6/2018, symptoms began July/Aug 2018. Uniform consists of pants or skirt, blazer, shirt, sweater, scarf on her neck. She has changes in her skin - dry, feels like there is a film, burns from the inside out, feels there is acid poured on her skin. Has been out of work since September. Not using any skin care products currently - no face wash, moisturizer. Has not changed products. If she is on prednisone her skin will clear up, if she is not on steroids the reaction returns. Went to a dermatologist on Oct 30th, has not had patch testing done. Reports she is also having hair loss. Rash is primarily on her face but has also had it on her abdomen, starting to clear up.     Has a rash on her skin - feels like someone has burned her with a curling iron    Also having shortness of breath and wheezing. Also has popping in her ears that is non-stop. States she was diagnosed with asthma in the late 90s. Not on daily asthma medication, currently using albuterol daily for 2 weeks. Also takes cetirizine. Albuterol is helpful. Never on daily medication. Symptoms reslve with prednisone. Does have a history of atopic dermatitis - states it was only on her arms. Has had asthma issues for the  past year. Went to the clinic for asthma in the past year and has been using albuterol over the past wear. Wakes up at night due to difficulty breathing, palpitations, and chest hurting.      [id number]    Component      Latest Ref Rng & Units 11/21/2019   WBC      4.0 - 11.0 10e9/L 13.0 (H)   RBC Count      3.8 - 5.2 10e12/L 4.29   Hemoglobin      11.7 - 15.7 g/dL 12.3   Hematocrit      35.0 - 47.0 % 37.7   MCV      78 - 100 fl 88   MCH      26.5 - 33.0 pg 28.7   MCHC      31.5 - 36.5 g/dL 32.6   RDW      10.0 - 15.0 % 13.3   Platelet Count      150 - 450 10e9/L 304   % Neutrophils      % 87.2   % Lymphocytes      % 8.5   % Monocytes      % 3.9   % Eosinophils      % 0.2   % Basophils      % 0.2   Absolute Neutrophil      1.6 - 8.3 10e9/L 11.3 (H)   Absolute Lymphocytes      0.8 - 5.3 10e9/L 1.1   Absolute Monocytes      0.0 - 1.3 10e9/L 0.5   Absolute Eosinophils      0.0 - 0.7 10e9/L 0.0   Absolute Basophils      0.0 - 0.2 10e9/L 0.0   Diff Method       Automated Method   Cholesterol      <200 mg/dL 216 (H)   Triglycerides      <150 mg/dL 41   HDL Cholesterol      >49 mg/dL 73   LDL Cholesterol Calculated      <100 mg/dL 135 (H)   Non HDL Cholesterol      <130 mg/dL 143 (H)   HIV Antigen Antibody Combo      NR:Nonreactive     Nonreactive   TSH      0.40 - 4.00 mU/L 0.97       Past Medical History:   Diagnosis Date     Allergic rhinitis due to animal dander      Anemia      Atopic dermatitides      Cervical radiculopathy     RIGHT > LEFT     Cervical radiculopathy      Diagnostic skin and sensitization tests 4/05 skin tests pos. for: Dog/feather/DM/M/T/G/W, did IT about 1 yr--not much help per pt.       Flexor tendon laceration, finger, open wound     LEFT 5TH     Migraine headache      Mild intermittent asthma      Mild intermittent asthma      Rhinitis, allergic to other allergen      Schizophrenia (H)      Seasonal allergic conjunctivitis      Seasonal allergic conjunctivitis      Seasonal allergic rhinitis 4/05  skin tests    Dog/feather/DM/M/T/G/W, did IT about 1 yr ('07-'08)     Seasonal allergic rhinitis      Vitamin D deficiency      Family History   Problem Relation Age of Onset     Cancer Mother      Asthma Mother      Hypertension Maternal Grandmother      Glaucoma Maternal Grandmother      Macular Degeneration Maternal Grandmother      Diabetes No family hx of      Cerebrovascular Disease No family hx of      Thyroid Disease No family hx of      Past Surgical History:   Procedure Laterality Date     C NONSPECIFIC PROCEDURE      Elective Abortions x2     REPAIR TENDON FINGER(S)  5/18/2012    Procedure:REPAIR TENDON FINGER(S); Left Small Finger Flexor Tendon Laceration Repair  ; Surgeon:MIGUE KATHLEEN; Location: OR       ENVIRONMENTAL HISTORY: [place]. The home is heated with a forced air and gas furnace. They do have central air conditioning. The patient's bedroom is furnished with feather/wool bedding or pillows, carpeting in bedroom and fabric window coverings.  Pets inside the house include None. There is no history of cockroach or mice infestation. There is/are 0 smokers in the house.  The house does not have a basement.     SOCIAL HISTORY:   Vita is employed for [employer]. She has missed 1.5 months of school/work due to reactions. She lives with her self.      REVIEW OF SYSTEMS:  General: negative for weight gain. positive  for weight loss. negative for changes in sleep.   Eyes: positive  for itching. negative for redness. positive  for tearing/watering. positive  for vision changes  Ears: positive  for fullness. negative for hearing loss. negative for dizziness. Positive for ear popping.   Nose: negative for snoring.negative for changes in smell. negative for drainage.   Throat: negative for hoarseness. negative for sore throat. negative for trouble swallowing.   Lungs: negative for cough. positive  for shortness of breath.negative for wheezing. negative for  sputum production.   Cardiovascular: negative for chest pain. negative for swelling of ankles. negative for fast or irregular heartbeat.   Gastrointestinal: negative for nausea. negative for heartburn. negative for acid reflux.   Musculoskeletal: negative for joint pain. negative for joint stiffness. negative for joint swelling.   Neurologic: negative for seizures. negative for fainting. negative for weakness.   Psychiatric: negative for changes in mood. negative for anxiety.   Endocrine: negative for cold intolerance. negative for heat intolerance. negative for tremors.   Hematologic: negative for easy bruising. negative for easy bleeding.  Integumentary: positive  for rash. negative for scaling. negative for nail changes. Positive for itching.       Current Outpatient Medications:      albuterol (PROAIR HFA/PROVENTIL HFA/VENTOLIN HFA) 108 (90 Base) MCG/ACT inhaler, Inhale 2 puffs into the lungs every 4 hours as needed for shortness of breath / dyspnea, Disp: 1 Inhaler, Rfl: 3     budesonide-formoterol (SYMBICORT) 160-4.5 MCG/ACT Inhaler, Inhale 2 puffs into the lungs 2 times daily, Disp: 1 Inhaler, Rfl: 1     cetirizine (ZYRTEC) 10 MG tablet, Take 1 tablet (10 mg) by mouth daily, Disp: 90 tablet, Rfl: 3     famotidine (PEPCID) 40 MG tablet, Take 1 tablet (40 mg) by mouth 2 times daily, Disp: 60 tablet, Rfl: 3     triamcinolone (ARISTOCORT HP) 0.5 % external cream, Apply  topically 2 times daily as needed., Disp: 45 g, Rfl: 1     desonide (DESOWEN) 0.05 % external cream, Apply topically 2 times daily Thin layer to face (Patient not taking: Reported on 12/2/2019), Disp: 30 g, Rfl: 1     montelukast (SINGULAIR) 10 MG tablet, Take 1 tablet (10 mg) by mouth At Bedtime (Patient not taking: Reported on 12/2/2019), Disp: 90 tablet, Rfl: 3  Immunization History   Administered Date(s) Administered     TDAP Vaccine (Adacel) 04/29/2012     Allergies   Allergen Reactions     Tomato Swelling     Face gets puffy         EXAM:  "  Pulse 95   Ht 1.6 m (5' 3\")   Wt 51.4 kg (113 lb 6.4 oz)   SpO2 100%   BMI 20.09 kg/m     GENERAL APPEARANCE: alert, cooperative and not in distress  SKIN: dry, scaly, hyperpigmented patches on lower eyelids extending from corner of eye to upper cheeks, diffuse xerosis and scaling on face, back, and extremities  HEAD: atraumatic, normocephalic  EYES: conjunctivae and sclerae clear, pupils equal, round, reactive to light, EOM full and intact  ENT: no scars or lesions, nasal exam showed no discharge, swelling or lesions noted, otoscopy showed external auditory canals clear, tympanic membranes normal, tongue midline and normal, soft palate, uvula, and tonsils normal  NECK: no asymmetry, masses, or scars, supple without significant adenopathy  LUNGS: unlabored respirations, no intercostal retractions or accessory muscle use, clear to auscultation without rales or wheezes  HEART: regular rate and rhythm without murmurs and normal S1 and S2  MUSCULOSKELETAL: no musculoskeletal defects are noted  NEURO: no focal deficits noted  PSYCH: does not appear depressed or anxious    WORKUP: Spirometry  SPIROMETRY       FVC 2.18L (76% of predicted).     FEV1 2.13L (91% of predicted).     FEV1/FVC 97%      I have reviewed and interpreted these results. Testing could not be fully interpreted as patient was unable to adequately perform the maneuver despite several attempts.    Asthma Control Test (ACT) total score: 8     ASSESSMENT/PLAN:  Vita Adrian is a 44 year old female    ***      Thank you for allowing me to participate in the care of Vita Adrian.      Nicole Back MD  Allergy/Immunology  [facility]      Chart documentation done in part with Dragon Voice Recognition Software. Although reviewed after completion, some word and grammatical errors may remain.    Again, thank you for allowing me to participate in the care of your patient.        Sincerely,        Nicole Back MD  "

## 2019-12-02 NOTE — PATIENT INSTRUCTIONS
If you have any questions regarding your allergies, asthma, or what we discussed during your visit today please call the allergy clinic or contact us via [employer].    [facility] Allergy RN Line: [phone]  [facility] Scheduling Line: [phone]  [facility] Scheduling Line: [phone]      1. Start taking the Symbicort Inhaler - 2 puffs twice a day. Use with the spacer. Rinse your mouth and brush your teeth afterwards.    2. Take 2 to 4 puffs of the albuterol inhaler every 4 hours as needed    3. Schedule an appointment with the dermatologist - you may need patch testing or further testing to determine the cause of your symptoms and possibly your hair loss as well    4. Take the cetirizine (generic for Zyrtec) as follows - 2 tablets (20mg) twice a day. You may need to purchase extra medication over the counter    5. Start the montelukast (generic for Singulair) as well to help with both the asthma and itching    6. Follow-up in 1 month      Patient Education     Using an Inhaler with a Spacer  To control asthma, you need to use your medicines the right way. Some medicines are inhaled using a device called a metered-dose inhaler (MDI). Metered-dose inhalers deliver medicine with a fine spray. You may be asked to use a spacer (holding tube) with your inhaler. The spacer helps make sure all the medicine you need goes into your lungs.   Steps for using an inhaler with a spacer  Step 1:    Remove the caps from the inhaler and spacer.    Shake the inhaler well and attach the spacer. If the inhaler is being used for the first time or has not been used for a while, prime it as directed by the product maker.  Step 2:    Breathe out normally.    Put the spacer between your teeth. Close your lips tightly around it.    Keep your chin up.  Step 3:    Spray 1 puff into the spacer by pressing down on the inhaler.    Then breathe in through your mouth as slowly and deeply as you can. This should  take about 5-10 seconds. If you breathe too quickly, you may hear a whistling sound in certain spacers.  Step 4:    Take the spacer out of your mouth.    Hold your breath for a count of 10.    Then hold your lips together and slowly breathe out through your mouth.          If you re prescribed more than 1 puff of medicine at a time, wait at least 30 seconds between puffs. This number may be different for different medicines. Shake the inhaler again. Then repeat steps 2 to 4.   Date Last Reviewed: 10/1/2016    2486-2139 [employer]. [address] [place] [address]. All rights reserved. This information is not intended as a substitute for professional medical care. Always follow your healthcare professional's instructions.

## 2019-12-02 NOTE — PROGRESS NOTES
"Dear Kristin Kauffman, APRN, CNP,    Thank you for referring your patient Vita Adrian to the Allergy/Immunology Clinic. Vita Adrian was seen in the Allergy Clinic at [facility]. The following are my recommendations regarding her Mild Persistent Asthma, Rash and Itching    1. Begin Symbicort 160/4.5mcg, 2 puffs twice daily  2. Take 2 to 4 puffs of albuterol HFA every 4 hours as needed  3. Increase cetirizine to 20mg twice daily  4. Begin montelukast 10mg daily  5. Begin famotidine 40mg twice daily  6. Apply 0.05% desonide cream to affected areas on face twice daily  7. Apply 0.5% triamcinolone cream to affected areas on body  8. Recommend further evaluation with dermatology regarding rash and itching  9. Follow-up in 4 weeks      Vita Adrian is a 44 year old  female being seen today at the request of Kristin Kauffman in consultation for allergies. She has been working for [employer] and states the company got new uniforms in 6/2018. She began having symptoms the following July or August which she attributes to these new uniforms. Vita has worn both the pants, skirt, shirt, blazer, sweater, and scarf - typically tied around her neck. Since she has been wearing the new uniforms she feels that her skin is very dry and that there is a film overlying her skin. She also describes feeling as though she is burning from the inside out and that acid has been poured on her skin. Because of these symptoms Vita has been out of work since September. She reports that she previously had \"flawless\" skin and denies wearing make-up other than lipstick and mascara. She now has very dry skin with hyperpigmented changes around her eyes. She is not currently using any facial skin care products including face wash and moisturizer. She was evaluated by a dermatologist in [place] however she states this was arranged by her employer and she felt the evaluation was " "limited. Vita was prescribed oral steroids and she reports that her skin cleared up with this medication. Her symptoms return once she completed the prednisone. In addition to her skin symptoms She states that she has been experiencing hair loss.    Vita also reports having symptoms of shortness of breath and wheezing. She has also had popping of her ears that has been \"non-stop.\" She was diagnosed with asthma in the late 1990s but has never been on a daily controller medication. She has been using albuterol daily for the last 2 weeks and does find it to be helpful. Vita states that her respiratory symptoms also improved with prednisone. Over the past year she has felt that her asthma issues have been slowly worsening. She does wake up at night with symptoms of difficulty breathing, chest discomfort, and palpitations.    Vita reports having a prior history of atopic dermatitis however this has been limited to her elbows and has never previously affected her face.      Skin Testing 4/5/05 - positive for sensitization to dog, dust mite, molds, trees, grass, and weeds      Component      Latest Ref Rng & Units 11/21/2019   WBC      4.0 - 11.0 10e9/L 13.0 (H)   RBC Count      3.8 - 5.2 10e12/L 4.29   Hemoglobin      11.7 - 15.7 g/dL 12.3   Hematocrit      35.0 - 47.0 % 37.7   MCV      78 - 100 fl 88   MCH      26.5 - 33.0 pg 28.7   MCHC      31.5 - 36.5 g/dL 32.6   RDW      10.0 - 15.0 % 13.3   Platelet Count      150 - 450 10e9/L 304   % Neutrophils      % 87.2   % Lymphocytes      % 8.5   % Monocytes      % 3.9   % Eosinophils      % 0.2   % Basophils      % 0.2   Absolute Neutrophil      1.6 - 8.3 10e9/L 11.3 (H)   Absolute Lymphocytes      0.8 - 5.3 10e9/L 1.1   Absolute Monocytes      0.0 - 1.3 10e9/L 0.5   Absolute Eosinophils      0.0 - 0.7 10e9/L 0.0   Absolute Basophils      0.0 - 0.2 10e9/L 0.0   Diff Method       Automated Method   Cholesterol      <200 mg/dL 216 (H)   Triglycerides      <150 mg/dL 41 "   HDL Cholesterol      >49 mg/dL 73   LDL Cholesterol Calculated      <100 mg/dL 135 (H)   Non HDL Cholesterol      <130 mg/dL 143 (H)   HIV Antigen Antibody Combo      NR:Nonreactive     Nonreactive   TSH      0.40 - 4.00 mU/L 0.97       Past Medical History:   Diagnosis Date     Allergic rhinitis due to animal dander      Anemia      Atopic dermatitides      Cervical radiculopathy     RIGHT > LEFT     Cervical radiculopathy      Diagnostic skin and sensitization tests 4/05 skin tests pos. for: Dog/feather/DM/M/T/G/W, did IT about 1 yr--not much help per pt.       Flexor tendon laceration, finger, open wound     LEFT 5TH     Migraine headache      Mild intermittent asthma      Mild intermittent asthma      Rhinitis, allergic to other allergen      Schizophrenia (H)      Seasonal allergic conjunctivitis      Seasonal allergic conjunctivitis      Seasonal allergic rhinitis 4/05 skin tests    Dog/feather/DM/M/T/G/W, did IT about 1 yr ('07-'08)     Seasonal allergic rhinitis      Vitamin D deficiency      Family History   Problem Relation Age of Onset     Cancer Mother      Asthma Mother      Hypertension Maternal Grandmother      Glaucoma Maternal Grandmother      Macular Degeneration Maternal Grandmother      Diabetes No family hx of      Cerebrovascular Disease No family hx of      Thyroid Disease No family hx of      Past Surgical History:   Procedure Laterality Date     C NONSPECIFIC PROCEDURE      Elective Abortions x2     REPAIR TENDON FINGER(S)  5/18/2012    Procedure:REPAIR TENDON FINGER(S); Left Small Finger Flexor Tendon Laceration Repair  ; Surgeon:MIGUE KATHLEEN; Location: OR       ENVIRONMENTAL HISTORY: The family [place] [facility] [place]. The home is heated with a forced air and gas furnace. They do have central air conditioning. The patient's bedroom is furnished with feather/wool bedding or pillows, carpeting in bedroom and fabric window coverings.  Pets inside the house include None.  There is no history of cockroach or mice infestation. There is/are 0 smokers in the house.  The house does not have a basement.     SOCIAL HISTORY:   Vita is employed for [employer]. She has missed 1.5 months of school/work due to reactions. She lives with her self.      REVIEW OF SYSTEMS:  General: negative for weight gain. positive  for weight loss. negative for changes in sleep.   Eyes: positive  for itching. negative for redness. positive  for tearing/watering. positive  for vision changes  Ears: positive  for fullness. negative for hearing loss. negative for dizziness. Positive for ear popping.   Nose: negative for snoring.negative for changes in smell. negative for drainage.   Throat: negative for hoarseness. negative for sore throat. negative for trouble swallowing.   Lungs: negative for cough. positive  for shortness of breath.negative for wheezing. negative for sputum production.   Cardiovascular: negative for chest pain. negative for swelling of ankles. negative for fast or irregular heartbeat.   Gastrointestinal: negative for nausea. negative for heartburn. negative for acid reflux.   Musculoskeletal: negative for joint pain. negative for joint stiffness. negative for joint swelling.   Neurologic: negative for seizures. negative for fainting. negative for weakness.   Psychiatric: negative for changes in mood. negative for anxiety.   Endocrine: negative for cold intolerance. negative for heat intolerance. negative for tremors.   Hematologic: negative for easy bruising. negative for easy bleeding.  Integumentary: positive  for rash. negative for scaling. negative for nail changes. Positive for itching.       Current Outpatient Medications:      albuterol (PROAIR HFA/PROVENTIL HFA/VENTOLIN HFA) 108 (90 Base) MCG/ACT inhaler, Inhale 2 puffs into the lungs every 4 hours as needed for shortness of breath / dyspnea, Disp: 1 Inhaler, Rfl: 3     budesonide-formoterol (SYMBICORT) 160-4.5 MCG/ACT Inhaler,  "Inhale 2 puffs into the lungs 2 times daily, Disp: 1 Inhaler, Rfl: 1     cetirizine (ZYRTEC) 10 MG tablet, Take 1 tablet (10 mg) by mouth daily, Disp: 90 tablet, Rfl: 3     famotidine (PEPCID) 40 MG tablet, Take 1 tablet (40 mg) by mouth 2 times daily, Disp: 60 tablet, Rfl: 3     triamcinolone (ARISTOCORT HP) 0.5 % external cream, Apply  topically 2 times daily as needed., Disp: 45 g, Rfl: 1     desonide (DESOWEN) 0.05 % external cream, Apply topically 2 times daily Thin layer to face (Patient not taking: Reported on 12/2/2019), Disp: 30 g, Rfl: 1     montelukast (SINGULAIR) 10 MG tablet, Take 1 tablet (10 mg) by mouth At Bedtime (Patient not taking: Reported on 12/2/2019), Disp: 90 tablet, Rfl: 3  Immunization History   Administered Date(s) Administered     TDAP Vaccine (Adacel) 04/29/2012     Allergies   Allergen Reactions     Tomato Swelling     Face gets puffy       EXAM:   Pulse 95   Ht 1.6 m (5' 3\")   Wt 51.4 kg (113 lb 6.4 oz)   SpO2 100%   BMI 20.09 kg/m    GENERAL APPEARANCE: alert, cooperative and not in distress  SKIN: dry, scaly, hyperpigmented patches on lower eyelids extending from corner of eye to upper cheeks, diffuse xerosis and scaling on face, back, and extremities, scalp could not be evaluated as patient was wearing a wig  HEAD: atraumatic, normocephalic  EYES: conjunctivae and sclerae clear, pupils equal, round, reactive to light, EOM full and intact  ENT: no scars or lesions, nasal exam showed no discharge, swelling or lesions noted, otoscopy showed external auditory canals clear, tympanic membranes normal, tongue midline and normal, soft palate, uvula, and tonsils normal  NECK: no asymmetry, masses, or scars, supple without significant adenopathy  LUNGS: unlabored respirations, no intercostal retractions or accessory muscle use, clear to auscultation without rales or wheezes  HEART: regular rate and rhythm without murmurs and normal S1 and S2  MUSCULOSKELETAL: no musculoskeletal defects " are noted  NEURO: no focal deficits noted  PSYCH: does not appear depressed or anxious    WORKUP: Spirometry  SPIROMETRY       FVC 2.18L (76% of predicted).     FEV1 2.13L (91% of predicted).     FEV1/FVC 97%      I have reviewed and interpreted these results. Testing could not be fully interpreted as patient was unable to adequately perform the maneuver despite several attempts.    Asthma Control Test (ACT) total score: 8     ASSESSMENT/PLAN:  Vita Adrian is a 44 year old female here for evaluation of rash, itching, and asthma. She reports that she has had a chronic rash and itching for the past year. Her face has been affected more than other areas though she reports that the rash has spread to her abdomen previously as well. She has chronic dryness, scaling, and hyperpigmentation of her face. Vita feels that her symptoms began after she received new uniforms at work. Her symptoms have continued to persist though she has not worn the uniform in the past 2-3 months. She does have a prior history of atopic dermatitis though it has not previously affected her face.    Vita also reports that her asthma has been poorly controlled over the past year. She has been using albuterol daily to manage both daytime and nocturnal asthma symptoms. Spirometry was attempted but could not be interpreted.    1. Begin Symbicort 160/4.5mcg, 2 puffs twice daily  2. Take 2 to 4 puffs of albuterol HFA every 4 hours as needed  3. Increase cetirizine to 20mg twice daily  4. Begin montelukast 10mg daily  5. Begin famotidine 40mg twice daily  6. Apply 0.05% desonide cream to affected areas on face twice daily  7. Apply 0.5% triamcinolone cream to affected areas on body  8. Recommend further evaluation with dermatology regarding rash and itching  9. Follow-up in 4 weeks      Thank you for allowing me to participate in the care of Vita Adrian.      Nicole Back MD  Allergy/Immunology  [facility]      Chart documentation done in part with Dragon Voice Recognition Software. Although reviewed after completion, some word and grammatical errors may remain.

## 2019-12-03 ASSESSMENT — ASTHMA QUESTIONNAIRES: ACT_TOTALSCORE: 8

## 2019-12-23 ENCOUNTER — TELEPHONE (OUTPATIENT)
Dept: FAMILY MEDICINE | Facility: CLINIC | Age: 44
End: 2019-12-23

## 2019-12-23 DIAGNOSIS — R21 RASH: Primary | ICD-10-CM

## 2019-12-23 NOTE — TELEPHONE ENCOUNTER
Plan does not cover desonide (DESOWEN) 0.05 % external cream. Please call plan at [phone] to initiate prior authorization or call/fax pharmacy to change med.      Pt ID# [id number]    Becca Avila  [facility]

## 2019-12-30 NOTE — TELEPHONE ENCOUNTER
Central Prior Authorization Team   Phone: [phone]      PA Initiation    Medication: PA- desonide (DESOWEN) 0.05 % external cream - INITIATED  Insurance Company: [id number] - Phone [phone] Fax [phone]  Pharmacy Filling the Rx: [id number] - [address] [place] [facility]  [place]  Filling Pharmacy Phone: [phone]  Filling Pharmacy Fax: [phone]  Start Date: 12/30/2019

## 2020-01-03 NOTE — TELEPHONE ENCOUNTER
Central Prior Authorization Team   Phone: [phone]      PRIOR AUTHORIZATION DENIED    Medication: PA- desonide (DESOWEN) 0.05 % external cream - DENIED    Denial Date: 12/31/2019    Denial Rational: Pt must try alclometasone for a minimum of 3 months.    Verbal denial received from Lewis with [employer]    Appeal Information:   [employer] Appeals Department  Phone # [phone]

## 2020-01-08 RX ORDER — ALCLOMETASONE DIPROPIONATE 0.5 MG/G
CREAM TOPICAL 2 TIMES DAILY
Qty: 15 G | Refills: 1 | Status: SHIPPED | OUTPATIENT
Start: 2020-01-08

## 2020-01-08 NOTE — TELEPHONE ENCOUNTER
Sent the covered equivalent steroid cream alclometasone to pharmacy instead. Please notify patient.   .LONG Dinh CNP

## 2020-01-08 NOTE — TELEPHONE ENCOUNTER
"Central Prior Authorization Team   Phone: [phone]      Bianka from [facility] called - on the phone with pt, stating pt has been trying to get ahold of the clinic for an appeal with no answer and pt can now request a \"continuity of care appeal\".    Pt attempting to appeal herself - ins prefers MD to initiate.    Bianka requesting an urgent appeal be sent as this is a \"quality of care\" issue.    I can submit an urgent appeal if a letter of medical necessity is provided.  "

## 2020-01-09 NOTE — TELEPHONE ENCOUNTER
Central Prior Authorization Team   Phone: [phone]      Bianka from [facility] called - stated pt initiated a continuity of care appeal yesterday, 01/08/2020, and was approved for a 3 month supply.

## 2020-01-10 NOTE — TELEPHONE ENCOUNTER
ARETHA, see the letter from [facility]- medication approved from 1/8/2020-4/7/2020 and looks like they called her to inform her of this.

## 2020-02-16 ENCOUNTER — HEALTH MAINTENANCE LETTER (OUTPATIENT)
Age: 45
End: 2020-02-16

## 2020-06-01 ENCOUNTER — TELEPHONE (OUTPATIENT)
Dept: FAMILY MEDICINE | Facility: CLINIC | Age: 45
End: 2020-06-01

## 2020-06-01 DIAGNOSIS — L29.9 PRURITIC DISORDER: Primary | ICD-10-CM

## 2020-06-01 RX ORDER — HYDROCORTISONE 2.5 %
CREAM (GRAM) TOPICAL 2 TIMES DAILY
Qty: 30 G | Refills: 3 | Status: SHIPPED | OUTPATIENT
Start: 2020-06-01

## 2020-06-01 NOTE — TELEPHONE ENCOUNTER
Plan does not cover desonide (DESOWEN) 0.05 % external cream .  Please call [phone] to initiate Prior Auth or change med.    Insurance type and ID number: ID# [id number]      Additional Information:     Fe Finn

## 2020-11-22 ENCOUNTER — HEALTH MAINTENANCE LETTER (OUTPATIENT)
Age: 45
End: 2020-11-22

## 2021-02-13 ENCOUNTER — HEALTH MAINTENANCE LETTER (OUTPATIENT)
Age: 46
End: 2021-02-13

## 2021-04-10 ENCOUNTER — HEALTH MAINTENANCE LETTER (OUTPATIENT)
Age: 46
End: 2021-04-10

## 2021-09-19 ENCOUNTER — HEALTH MAINTENANCE LETTER (OUTPATIENT)
Age: 46
End: 2021-09-19

## 2022-03-06 ENCOUNTER — HEALTH MAINTENANCE LETTER (OUTPATIENT)
Age: 47
End: 2022-03-06

## 2022-05-01 ENCOUNTER — HEALTH MAINTENANCE LETTER (OUTPATIENT)
Age: 47
End: 2022-05-01

## 2022-11-21 ENCOUNTER — HEALTH MAINTENANCE LETTER (OUTPATIENT)
Age: 47
End: 2022-11-21

## 2023-04-16 ENCOUNTER — HEALTH MAINTENANCE LETTER (OUTPATIENT)
Age: 48
End: 2023-04-16

## 2023-06-02 ENCOUNTER — HEALTH MAINTENANCE LETTER (OUTPATIENT)
Age: 48
End: 2023-06-02
